# Patient Record
Sex: FEMALE | Race: WHITE
[De-identification: names, ages, dates, MRNs, and addresses within clinical notes are randomized per-mention and may not be internally consistent; named-entity substitution may affect disease eponyms.]

---

## 2017-04-04 ENCOUNTER — HOSPITAL ENCOUNTER (OUTPATIENT)
Age: 67
Discharge: HOME | End: 2017-04-04
Payer: MEDICARE

## 2017-04-04 DIAGNOSIS — I10: Primary | ICD-10-CM

## 2017-04-04 DIAGNOSIS — G47.33: ICD-10-CM

## 2017-06-08 ENCOUNTER — HOSPITAL ENCOUNTER (OUTPATIENT)
Dept: HOSPITAL 76 - LAB | Age: 67
Discharge: HOME | End: 2017-06-08
Attending: PHYSICIAN ASSISTANT
Payer: MEDICARE

## 2017-06-08 DIAGNOSIS — R73.01: Primary | ICD-10-CM

## 2017-06-08 DIAGNOSIS — E78.5: ICD-10-CM

## 2017-06-08 DIAGNOSIS — I10: ICD-10-CM

## 2017-06-08 LAB
ALBUMIN/GLOB SERPL: 1.6 {RATIO} (ref 1–2.2)
ANION GAP SERPL CALCULATED.4IONS-SCNC: 7 MMOL/L (ref 6–13)
BILIRUB BLD-MCNC: 0.8 MG/DL (ref 0.2–1)
BUN SERPL-MCNC: 18 MG/DL (ref 6–20)
CALCIUM UR-MCNC: 9.4 MG/DL (ref 8.5–10.3)
CHLORIDE SERPL-SCNC: 100 MMOL/L (ref 101–111)
CHOLEST SERPL-MCNC: 256 MG/DL
CO2 SERPL-SCNC: 29 MMOL/L (ref 21–32)
CREAT SERPLBLD-SCNC: 0.7 MG/DL (ref 0.4–1)
EST. AVERAGE GLUCOSE BLD GHB EST-MCNC: 117 MG/DL (ref 70–100)
GFRSERPLBLD MDRD-ARVRAT: 84 ML/MIN/{1.73_M2} (ref 89–?)
GLOBULIN SER-MCNC: 2.5 G/DL (ref 2.1–4.2)
GLUCOSE SERPL-MCNC: 107 MG/DL (ref 70–100)
HBA1C BLD-MCNC: 0.58 G/DL
HDLC SERPL-MCNC: 82 MG/DL
HDLC SERPL: 3.1 {RATIO} (ref ?–4.4)
LDLC/HDLC SERPL: 2 {RATIO} (ref ?–4.4)
POTASSIUM SERPL-SCNC: 3.6 MMOL/L (ref 3.5–5)
PROT SPEC-MCNC: 6.6 G/DL (ref 6.7–8.2)
SODIUM SERPLBLD-SCNC: 136 MMOL/L (ref 135–145)
TRIGL P FAST SERPL-MCNC: 70 MG/DL
VLDLC SERPL-SCNC: 14 MG/DL

## 2017-06-08 PROCEDURE — 80061 LIPID PANEL: CPT

## 2017-06-08 PROCEDURE — 80053 COMPREHEN METABOLIC PANEL: CPT

## 2017-06-08 PROCEDURE — 36415 COLL VENOUS BLD VENIPUNCTURE: CPT

## 2017-06-08 PROCEDURE — 83036 HEMOGLOBIN GLYCOSYLATED A1C: CPT

## 2017-08-29 ENCOUNTER — HOSPITAL ENCOUNTER (OUTPATIENT)
Dept: HOSPITAL 76 - LAB | Age: 67
Discharge: HOME | End: 2017-08-29
Attending: PHYSICIAN ASSISTANT
Payer: MEDICARE

## 2017-08-29 DIAGNOSIS — E78.5: Primary | ICD-10-CM

## 2017-08-29 DIAGNOSIS — I10: ICD-10-CM

## 2017-08-29 LAB
ALBUMIN/GLOB SERPL: 1.7 {RATIO} (ref 1–2.2)
ANION GAP SERPL CALCULATED.4IONS-SCNC: 10 MMOL/L (ref 6–13)
BASOPHILS NFR BLD AUTO: 0.1 10^3/UL (ref 0–0.1)
BASOPHILS NFR BLD AUTO: 1.1 %
BILIRUB BLD-MCNC: 1 MG/DL (ref 0.2–1)
BUN SERPL-MCNC: 24 MG/DL (ref 6–20)
CALCIUM UR-MCNC: 9.7 MG/DL (ref 8.5–10.3)
CHLORIDE SERPL-SCNC: 96 MMOL/L (ref 101–111)
CO2 SERPL-SCNC: 29 MMOL/L (ref 21–32)
CREAT SERPLBLD-SCNC: 0.6 MG/DL (ref 0.4–1)
EOSINOPHIL # BLD AUTO: 0.1 10^3/UL (ref 0–0.7)
EOSINOPHIL NFR BLD AUTO: 2.2 %
ERYTHROCYTE [DISTWIDTH] IN BLOOD BY AUTOMATED COUNT: 13.4 % (ref 12–15)
GFRSERPLBLD MDRD-ARVRAT: 100 ML/MIN/{1.73_M2} (ref 89–?)
GLOBULIN SER-MCNC: 2.6 G/DL (ref 2.1–4.2)
GLUCOSE SERPL-MCNC: 108 MG/DL (ref 70–100)
HCT VFR BLD AUTO: 40.6 % (ref 37–47)
HGB UR QL STRIP: 13.9 G/DL (ref 12–16)
LYMPHOCYTES # SPEC AUTO: 2.4 10^3/UL (ref 1.5–3.5)
LYMPHOCYTES NFR BLD AUTO: 43.5 %
MCH RBC QN AUTO: 28.8 PG (ref 27–31)
MCHC RBC AUTO-ENTMCNC: 34.1 G/DL (ref 32–36)
MCV RBC AUTO: 84.4 FL (ref 81–99)
MONOCYTES # BLD AUTO: 0.3 10^3/UL (ref 0–1)
MONOCYTES NFR BLD AUTO: 5.8 %
NEUTROPHILS # BLD AUTO: 2.7 10^3/UL (ref 1.5–6.6)
NEUTROPHILS # SNV AUTO: 5.6 X10^3/UL (ref 4.8–10.8)
NEUTROPHILS NFR BLD AUTO: 47.4 %
NRBC # BLD AUTO: 0 /100WBC
PDW BLD AUTO: 7.3 FL (ref 7.9–10.8)
POTASSIUM SERPL-SCNC: 3.3 MMOL/L (ref 3.5–5)
PROT SPEC-MCNC: 7 G/DL (ref 6.7–8.2)
RBC MAR: 4.81 10^6/UL (ref 4.2–5.4)
SODIUM SERPLBLD-SCNC: 135 MMOL/L (ref 135–145)
WBC # BLD: 5.6 X10^3/UL

## 2017-08-29 PROCEDURE — 80053 COMPREHEN METABOLIC PANEL: CPT

## 2017-08-29 PROCEDURE — 36415 COLL VENOUS BLD VENIPUNCTURE: CPT

## 2017-08-29 PROCEDURE — 85025 COMPLETE CBC W/AUTO DIFF WBC: CPT

## 2017-09-27 ENCOUNTER — HOSPITAL ENCOUNTER (OUTPATIENT)
Dept: HOSPITAL 76 - LAB | Age: 67
Discharge: HOME | End: 2017-09-27
Attending: FAMILY MEDICINE
Payer: MEDICARE

## 2017-09-27 DIAGNOSIS — E87.6: Primary | ICD-10-CM

## 2017-09-27 LAB
ALBUMIN/GLOB SERPL: 1.6 {RATIO} (ref 1–2.2)
ANION GAP SERPL CALCULATED.4IONS-SCNC: 9 MMOL/L (ref 6–13)
BILIRUB BLD-MCNC: 0.8 MG/DL (ref 0.2–1)
BUN SERPL-MCNC: 20 MG/DL (ref 6–20)
CALCIUM UR-MCNC: 9.6 MG/DL (ref 8.5–10.3)
CHLORIDE SERPL-SCNC: 96 MMOL/L (ref 101–111)
CO2 SERPL-SCNC: 29 MMOL/L (ref 21–32)
CREAT SERPLBLD-SCNC: 0.7 MG/DL (ref 0.4–1)
GFRSERPLBLD MDRD-ARVRAT: 84 ML/MIN/{1.73_M2} (ref 89–?)
GLOBULIN SER-MCNC: 2.7 G/DL (ref 2.1–4.2)
GLUCOSE SERPL-MCNC: 97 MG/DL (ref 70–100)
POTASSIUM SERPL-SCNC: 3.7 MMOL/L (ref 3.5–5)
PROT SPEC-MCNC: 6.9 G/DL (ref 6.7–8.2)
SODIUM SERPLBLD-SCNC: 134 MMOL/L (ref 135–145)

## 2017-09-27 PROCEDURE — 36415 COLL VENOUS BLD VENIPUNCTURE: CPT

## 2017-09-27 PROCEDURE — 80053 COMPREHEN METABOLIC PANEL: CPT

## 2017-10-23 ENCOUNTER — HOSPITAL ENCOUNTER (OUTPATIENT)
Dept: HOSPITAL 76 - SC | Age: 67
Discharge: HOME | End: 2017-10-23
Attending: NURSE PRACTITIONER
Payer: MEDICARE

## 2017-10-23 DIAGNOSIS — G47.33: Primary | ICD-10-CM

## 2017-10-23 PROCEDURE — 99212 OFFICE O/P EST SF 10 MIN: CPT

## 2017-10-23 PROCEDURE — 99214 OFFICE O/P EST MOD 30 MIN: CPT

## 2017-11-30 ENCOUNTER — HOSPITAL ENCOUNTER (OUTPATIENT)
Dept: HOSPITAL 76 - DI | Age: 67
Discharge: HOME | End: 2017-11-30
Attending: PHYSICIAN ASSISTANT
Payer: MEDICARE

## 2017-11-30 DIAGNOSIS — M47.892: ICD-10-CM

## 2017-11-30 DIAGNOSIS — M51.36: ICD-10-CM

## 2017-11-30 DIAGNOSIS — R68.84: Primary | ICD-10-CM

## 2017-11-30 PROCEDURE — 72100 X-RAY EXAM L-S SPINE 2/3 VWS: CPT

## 2017-11-30 PROCEDURE — 72050 X-RAY EXAM NECK SPINE 4/5VWS: CPT

## 2017-11-30 PROCEDURE — 70110 X-RAY EXAM OF JAW 4/> VIEWS: CPT

## 2017-11-30 NOTE — XRAY REPORT
THREE VIEW LUMBAR SPINE:  11/30/2017 

 

CLINICAL INDICATION:  Back pain. 

 

AP, lateral, coned-down views of the lumbar spine demonstrate degenerative disk disease, with disk sp
ace narrowing worst at L5-S1.  There is no evidence of compression fracture or subluxation.  The lui
l gas pattern is normal. 

 

IMPRESSION:  DEGENERATIVE DISK DISEASE, WORST AT L5-S1. 

 

 

 

DD:11/30/2017 13:03:32  DT: 11/30/2017 17:42  JOB #: [id number]  EXT JOB #:[id number]

## 2017-11-30 NOTE — XRAY REPORT
FOUR VIEW MANDIBLE:  11/30/2017 

 

CLINICAL INDICATION:  Jaw pain. 

 

Ravinder's, AP, bilateral oblique views of the mandible demonstrate no evidence of mandibular fracture. 
 The mandibular condyles are normally seated in the temporal fossa bilaterally.  No mandibular condyl
ar erosion is identified. 

 

IMPRESSION:  NORMAL MANDIBLE. 

 

 

 

DD:11/30/2017 13:01:14  DT: 11/30/2017 17:38  JOB #: [id number]  EXT JOB #:[id number]

## 2017-11-30 NOTE — XRAY REPORT
COMPLETE CERVICAL SPINE:  11/30/2017 

 

CLINICAL INDICATION:  Neck pain. 

 

AP, lateral, odontoid, bilateral oblique views of the cervical spine demonstrate normal height and al
ignment of the vertebral bodies.  Mild facet arthropathy is present.  There is no evidence of fractur
es or subluxation.  No significant osseous neural foraminal narrowing is present. 

 

IMPRESSION:  MILD FACET ARTHROPATHY. 

 

 

 

DD:11/30/2017 13:02:15  DT: 11/30/2017 17:40  JOB #: [id number]  EXT JOB #:[id number]

## 2018-01-08 ENCOUNTER — HOSPITAL ENCOUNTER (OUTPATIENT)
Dept: HOSPITAL 76 - DI | Age: 68
Discharge: HOME | End: 2018-01-08
Attending: PHYSICIAN ASSISTANT
Payer: MEDICARE

## 2018-01-08 DIAGNOSIS — Z12.39: Primary | ICD-10-CM

## 2018-01-08 DIAGNOSIS — Z85.3: ICD-10-CM

## 2018-01-08 PROCEDURE — 77067 SCR MAMMO BI INCL CAD: CPT

## 2018-01-10 NOTE — MAMMOGRAPHY REPORT
DATE OF SERVICE: 01/08/2018

 

DIGITAL SCREENING MAMMOGRAM:   01/08/2018

 

CLINICAL INDICATION:  A 67-year-old with history of late childbearing, personal history of left

breast cancer, status post lumpectomy and radiation therapy.

 

COMPARISON:  12/2016, 12/2015, 12/2014, 11/2013, 10/2012, 08/2011, 06/2010.

 

TECHNIQUE:  Routine CC and MLO projections were obtained of the breasts.

 

FINDINGS:  The breasts demonstrate scattered fibroglandular densities bilaterally. Coarse and 

punctate, typically benign calcifications are present.  Postoperative and posttreatment changes

in the left breast are stable.  No suspicious masses, clustered microcalcifications, or regions

of architectural distortion are identified.

 

IMPRESSION:  BENIGN FINDINGS.

RECOMMENDATION:  ROUTINE ANNUAL SCREENING UNLESS OTHERWISE CLINICALLY INDICATED.

BIRADS CATEGORY 2-BENIGN FINDINGS.

 

STANDARD QUALIFYING STATEMENTS:

1.  This examination was reviewed with the aid of Computer-Aided Detection (CAD).

2.  A negative or benign imaging report should not delay biopsy if clinically suspicious 

findings are present.  Consider surgical consultation if warranted.  More than 5% of cancers 

are not identified by imaging.

3.  Dense breasts may obscure an underlying neoplasm.

 

 

 

 

DD: 01/10/2018 13:55

TD: 01/10/2018 16:58

Job #: [id number]

## 2018-01-12 ENCOUNTER — HOSPITAL ENCOUNTER (OUTPATIENT)
Dept: HOSPITAL 76 - LAB.WCP | Age: 68
Discharge: HOME | End: 2018-01-12
Attending: PHYSICIAN ASSISTANT
Payer: MEDICARE

## 2018-01-12 DIAGNOSIS — I10: Primary | ICD-10-CM

## 2018-01-12 DIAGNOSIS — E55.9: ICD-10-CM

## 2018-01-12 LAB
ANION GAP SERPL CALCULATED.4IONS-SCNC: 5 MMOL/L (ref 6–13)
BASOPHILS NFR BLD AUTO: 0.1 10^3/UL (ref 0–0.1)
BASOPHILS NFR BLD AUTO: 1.1 %
BUN SERPL-MCNC: 27 MG/DL (ref 6–20)
CALCIUM UR-MCNC: 9.5 MG/DL (ref 8.5–10.3)
CHLORIDE SERPL-SCNC: 101 MMOL/L (ref 101–111)
CO2 SERPL-SCNC: 27 MMOL/L (ref 21–32)
CREAT SERPLBLD-SCNC: 0.7 MG/DL (ref 0.4–1)
EOSINOPHIL # BLD AUTO: 0.1 10^3/UL (ref 0–0.7)
EOSINOPHIL NFR BLD AUTO: 1.9 %
ERYTHROCYTE [DISTWIDTH] IN BLOOD BY AUTOMATED COUNT: 13.1 % (ref 12–15)
GFRSERPLBLD MDRD-ARVRAT: 84 ML/MIN/{1.73_M2} (ref 89–?)
GLUCOSE SERPL-MCNC: 96 MG/DL (ref 70–100)
HGB UR QL STRIP: 14.7 G/DL (ref 12–16)
LYMPHOCYTES # SPEC AUTO: 3.1 10^3/UL (ref 1.5–3.5)
LYMPHOCYTES NFR BLD AUTO: 45.8 %
MCH RBC QN AUTO: 28.9 PG (ref 27–31)
MCHC RBC AUTO-ENTMCNC: 32.9 G/DL (ref 32–36)
MCV RBC AUTO: 87.8 FL (ref 81–99)
MONOCYTES # BLD AUTO: 0.3 10^3/UL (ref 0–1)
MONOCYTES NFR BLD AUTO: 4.7 %
NEUTROPHILS # BLD AUTO: 3.1 10^3/UL (ref 1.5–6.6)
NEUTROPHILS # SNV AUTO: 6.8 X10^3/UL (ref 4.8–10.8)
NEUTROPHILS NFR BLD AUTO: 46.5 %
PDW BLD AUTO: 7.9 FL (ref 7.9–10.8)
PLATELET # BLD: 267 10^3/UL (ref 130–450)
RBC MAR: 5.08 10^6/UL (ref 4.2–5.4)
SODIUM SERPLBLD-SCNC: 133 MMOL/L (ref 135–145)

## 2018-01-12 PROCEDURE — 80048 BASIC METABOLIC PNL TOTAL CA: CPT

## 2018-01-12 PROCEDURE — 36415 COLL VENOUS BLD VENIPUNCTURE: CPT

## 2018-01-12 PROCEDURE — 85025 COMPLETE CBC W/AUTO DIFF WBC: CPT

## 2018-01-12 PROCEDURE — 82306 VITAMIN D 25 HYDROXY: CPT

## 2018-05-24 ENCOUNTER — HOSPITAL ENCOUNTER (OUTPATIENT)
Dept: HOSPITAL 76 - DI | Age: 68
Discharge: HOME | End: 2018-05-24
Attending: PHYSICIAN ASSISTANT
Payer: MEDICARE

## 2018-05-24 DIAGNOSIS — C50.912: ICD-10-CM

## 2018-05-24 DIAGNOSIS — M48.061: ICD-10-CM

## 2018-05-24 DIAGNOSIS — M51.26: ICD-10-CM

## 2018-05-24 DIAGNOSIS — M54.5: Primary | ICD-10-CM

## 2018-05-24 PROCEDURE — 72148 MRI LUMBAR SPINE W/O DYE: CPT

## 2018-05-24 NOTE — MRI REPORT
EXAM:

MRI LUMBAR SPINE WITHOUT CONTRAST

 

EXAM DATE: 5/24/2018 01:10 PM.

 

CLINICAL HISTORY: Chronic low back pain.

 

COMPARISON: Lumbar spine radiography from 11/30/2017.

 

TECHNIQUE: Multiplanar, multisequence T1-weighted and fluid-sensitive sequences of the lumbar spine f
rom T12 to S1 without contrast. Other: None.

 

FINDINGS: 

Spinal Cord: The conus terminates at L1-L2. The conus medullaris and cauda equina are unremarkable.

 

Alignment: No scoliosis or spondylolisthesis.

 

Bone Marrow: Five non-rib-bearing lumbar vertebral bodies are assumed. There is a probable hemangioma
 or focal fatty marrow deposit within the T12 vertebral body. Heterogeneous marrow signal composed of
 hematopoietic and fatty marrow. No bone lesions. No acute fracture.

 

Disk Levels/Facets:

 

T10-T11: Small left foraminal disk protrusion. Mild left foraminal stenosis. Note, axial images were 
not obtained at this disk level.

 

T11-T12: Unremarkable.

 

T12-L1: There is a small T1 isointense and T2 hypointense extradural focus within the left anterolate
ral aspect of the spinal canal at the T12 level. This area is not imaged in the axial plane.

 

L1-L2: There is a similar appearing small T1 isointense and T2 hypointense extradural focus within th
e left anterolateral aspect of the spinal canal at the L1 level. This area is not imaged in the axial
 plane.

 

L2-L3: Tiny right foraminal disk protrusion. Minimal right foraminal narrowing.

 

L3-L4: Minimal disk bulge. Mild to moderate ligamentum flavum thickening and facet arthropathy. Mild 
right foraminal stenosis.

 

L4-L5: Mild to moderate facet arthropathy. Mild to moderate right and mild left foraminal stenoses.

 

L5-S1: Type I degenerative endplate changes. Moderate to severe disk space narrowing. Minimal disk bu
lge. Mild facet arthropathy. Mild foraminal stenoses.

 

Musculature: Mild fatty atrophy of the posterior paraspinal muscles.

 

Other: The partially visualized retroperitoneum is unremarkable.

 

IMPRESSION: 

1. Small T1 isointense and T2 hypointense extradural foci of tissue within the left anterolateral asp
ect of the spinal canal at the T12 and L1 levels. Differential may include part of the basivertebral 
venous plexus, extruded disk tissue or other mass lesion. These foci of tissue are not completely debi
luated or imaged on this exam. Recommend follow-up T1 and T2 weighted imaging without and with intrav
enous contrast at these levels. 

2. Moderate to severe disk space narrowing at L5-S1. Minimal disk bulge. Mild foraminal stenoses. 

3. Tiny right foraminal disk protrusion at L2-L3. Minimal right foraminal narrowing. 

4. Minimal disk bulge at L3-L4. Mild right foraminal stenosis. 

5. Mild to moderate right and mild left foraminal stenoses at L4-L5. 

 

 

Comment: The following findings are so common in adults without low back pain that while we report th
eir presence, they must be interpreted with caution and in the context of the clinical situation. (Re
angelita Resendez et al, Spine 2001)

 

Prevalence of findings in patients without low back pain:

Disk degeneration (any evidence): 92%

Disk desiccation/T2 signal loss: 83%

Disk height loss: 56%

Disk bulge: 64%

Disk protrusion: 32%

Annular tear/high intensity zone: 38%

 

[facility]

Referring Provider Line: [phone]

 

SITE ID: [id number]

## 2018-06-05 ENCOUNTER — HOSPITAL ENCOUNTER (OUTPATIENT)
Dept: HOSPITAL 76 - LAB | Age: 68
Discharge: HOME | End: 2018-06-05
Attending: FAMILY MEDICINE
Payer: MEDICARE

## 2018-06-05 DIAGNOSIS — I10: ICD-10-CM

## 2018-06-05 DIAGNOSIS — R53.83: Primary | ICD-10-CM

## 2018-06-05 DIAGNOSIS — M54.5: ICD-10-CM

## 2018-06-05 LAB
ALBUMIN DIAFP-MCNC: 4.1 G/DL (ref 3.2–5.5)
ALBUMIN/GLOB SERPL: 1.5 {RATIO} (ref 1–2.2)
ALP SERPL-CCNC: 77 IU/L (ref 42–121)
ALT SERPL W P-5'-P-CCNC: 31 IU/L (ref 10–60)
ANION GAP SERPL CALCULATED.4IONS-SCNC: 7 MMOL/L (ref 6–13)
AST SERPL W P-5'-P-CCNC: 25 IU/L (ref 10–42)
BASOPHILS NFR BLD AUTO: 0.1 10^3/UL (ref 0–0.1)
BASOPHILS NFR BLD AUTO: 1.3 %
BILIRUB BLD-MCNC: 0.7 MG/DL (ref 0.2–1)
BUN SERPL-MCNC: 20 MG/DL (ref 6–20)
CALCIUM UR-MCNC: 9.5 MG/DL (ref 8.5–10.3)
CHLORIDE SERPL-SCNC: 99 MMOL/L (ref 101–111)
CO2 SERPL-SCNC: 28 MMOL/L (ref 21–32)
CREAT SERPLBLD-SCNC: 0.6 MG/DL (ref 0.4–1)
EOSINOPHIL # BLD AUTO: 0.2 10^3/UL (ref 0–0.7)
EOSINOPHIL NFR BLD AUTO: 2.5 %
ERYTHROCYTE [DISTWIDTH] IN BLOOD BY AUTOMATED COUNT: 13.3 % (ref 12–15)
GFRSERPLBLD MDRD-ARVRAT: 100 ML/MIN/{1.73_M2} (ref 89–?)
GLOBULIN SER-MCNC: 2.7 G/DL (ref 2.1–4.2)
GLUCOSE SERPL-MCNC: 105 MG/DL (ref 70–100)
HGB UR QL STRIP: 14.1 G/DL (ref 12–16)
LYMPHOCYTES # SPEC AUTO: 2.9 10^3/UL (ref 1.5–3.5)
LYMPHOCYTES NFR BLD AUTO: 44.5 %
MCH RBC QN AUTO: 29.5 PG (ref 27–31)
MCHC RBC AUTO-ENTMCNC: 34.2 G/DL (ref 32–36)
MCV RBC AUTO: 86.3 FL (ref 81–99)
MONOCYTES # BLD AUTO: 0.4 10^3/UL (ref 0–1)
MONOCYTES NFR BLD AUTO: 6.3 %
NEUTROPHILS # BLD AUTO: 2.9 10^3/UL (ref 1.5–6.6)
NEUTROPHILS # SNV AUTO: 6.5 X10^3/UL (ref 4.8–10.8)
NEUTROPHILS NFR BLD AUTO: 45.4 %
PDW BLD AUTO: 7 FL (ref 7.9–10.8)
PLATELET # BLD: 227 10^3/UL (ref 130–450)
PROT SPEC-MCNC: 6.8 G/DL (ref 6.7–8.2)
RBC MAR: 4.78 10^6/UL (ref 4.2–5.4)
SODIUM SERPLBLD-SCNC: 134 MMOL/L (ref 135–145)

## 2018-06-05 PROCEDURE — 36415 COLL VENOUS BLD VENIPUNCTURE: CPT

## 2018-06-05 PROCEDURE — 85025 COMPLETE CBC W/AUTO DIFF WBC: CPT

## 2018-06-05 PROCEDURE — 84443 ASSAY THYROID STIM HORMONE: CPT

## 2018-06-05 PROCEDURE — 80053 COMPREHEN METABOLIC PANEL: CPT

## 2018-06-07 ENCOUNTER — HOSPITAL ENCOUNTER (OUTPATIENT)
Dept: HOSPITAL 76 - LAB | Age: 68
Discharge: HOME | End: 2018-06-07
Attending: FAMILY MEDICINE
Payer: MEDICARE

## 2018-06-07 DIAGNOSIS — M48.061: ICD-10-CM

## 2018-06-07 DIAGNOSIS — M48.07: ICD-10-CM

## 2018-06-07 DIAGNOSIS — R93.8: ICD-10-CM

## 2018-06-07 DIAGNOSIS — R53.83: ICD-10-CM

## 2018-06-07 DIAGNOSIS — R93.7: ICD-10-CM

## 2018-06-07 DIAGNOSIS — M89.8X8: ICD-10-CM

## 2018-06-07 DIAGNOSIS — M54.5: Primary | ICD-10-CM

## 2018-06-07 DIAGNOSIS — G89.29: ICD-10-CM

## 2018-06-07 DIAGNOSIS — I10: ICD-10-CM

## 2018-06-07 PROCEDURE — 72158 MRI LUMBAR SPINE W/O & W/DYE: CPT

## 2018-06-08 NOTE — MRI REPORT
EXAM:

MRI LUMBAR SPINE WITHOUT AND WITH CONTRAST

 

EXAM DATE: 6/7/2018 01:58 PM.

 

CLINICAL HISTORY: 68-year-old female. Prior noncontrast MRI lumbar spine from 05/24/2018 suggesting T
1 isointense and T2 hypointense extradural foci of tissue within the left anterolateral aspect of spi
nal canal at T12 and L1 levels. History of chronic low back pain.

 

COMPARISONS: MRI lumbar spine 05/24/2018

 

TECHNIQUE: Multiplanar, multisequence T1-weighted and fluid-sensitive sequences of the lumbar spine f
rom T12 to S1 before and after administration of intravenous contrast. Other: None. IV contrast: 8.5 
mL Gadavist .

 

FINDINGS: 

Spinal Cord: The conus terminates at L1. The conus medullaris and cauda equina are unremarkable. 

 

Alignment: No scoliosis or spondylolisthesis.

 

Bone Marrow: Five non-rib-bearing lumbar vertebral bodies are assumed. No evidence of acute fracture.
 Diffusely heterogeneous and mottled appearance of the bone marrow, differential considerations invol
ving both benign and malignant marrow replacing processes, commonly fatty replacement of the marrow. 
Modic type I degenerative endplate changes at L5-S1 level with endplate edema and enhancement. No bon
e marrow edema elsewhere. T1 and T2 hyperintense lesions within T12, L3, and L4 levels are nonspecifi
c, may represent benign hemangiomata.

 

Similar to prior study performed on 05/24/2018, nonenhancing T2 hypointensity within the left anterio
r epidural space at T12 level (series 601 image 15) and L1 level (series 601 image 10).

 

No significant central canal narrowing at any visualized level. Mild bilateral foraminal narrowing at
 L2-L3 level. Mild-to-moderate right and mild left foraminal narrowing at L3-L4 level. Mild-to-modera
te bilateral foraminal narrowing at L4-L5 level mild bilateral foraminal narrowing at L5-S1.

 

Spinal Canal: No enhancing masses within the spinal canal. No epidural abscess.

 

Musculature: Normal. No edema, abnormal enhancement, or fatty atrophy.

 

Other: The visualized retroperitoneum is unremarkable.

 

 

IMPRESSION: 

1. Similar to prior study performed on 05/24/2018, nonenhancing T2 hypointensity within the left ante
rior epidural space at T12 level (series 601 image 15) and L1 level (series 601 image 10). The etiolo
gy is therefore likely benign, may represent chronic disk material versus venous structures, unlikely
 to represent a mass. There is no associated central canal narrowing.

 

2. Diffusely heterogeneous and mottled appearance of the bone marrow, differential considerations inv
olving both benign and malignant marrow replacing processes, commonly fatty replacement of the marrow
. 

 

3. Modic type I degenerative endplate changes at L5-S1 level with endplate edema and enhancement. Mod
ic type 1 changes may represent a source of pain.

 

4. No significant central canal narrowing at any visualized level. Mild bilateral foraminal narrowing
 at L2-L3 level. Mild-to-moderate right and mild left foraminal narrowing at L3-L4 level. Mild-to-mod
erate bilateral foraminal narrowing at L4-L5 level mild bilateral foraminal narrowing at L5-S1.

 

 

Comment: The following findings are so common in adults without low back pain that while we report th
eir presence, they must be interpreted with caution and in the context of the clinical situation. (Re
angelita Resendez et al, Spine 2001)

 

Prevalence of findings in patients without low back pain:

Disk degeneration (any evidence): 92%

Disk desiccation/T2 signal loss: 83%

Disk height loss: 56%

Disk bulge: 64%

Disk protrusion: 32%

Annular tear/high intensity zone: 38%

 

[facility]

Referring Provider Line: [phone]

 

SITE ID: [id number]